# Patient Record
(demographics unavailable — no encounter records)

---

## 2025-07-11 NOTE — RESULTS/DATA
[TextEntry] : RADIOLOGY  ACC: [id number] EXAM:  XR CHEST AP OR PA 1V   ORDERED BY: CHRISTINA ESCAMILLA  PROCEDURE DATE:  2025    INTERPRETATION:  Clinical history reason for exam: Shortness of breath  Chest frontal.  No comparison.   Findings/ impression: Bibasilar opacities. Slight elevation of the left hemidiaphragm. Heart and mediastinum are unremarkable. Thoracic spine degenerative changes.   PFT 2025 FVC 2.14 L, 59% predicted FEV1: 1.02 L, 36% predicted FEV1/FVC: 27% No change with bronchodilator ELC_N2: 5.47 L, 87% predicted RV: 2.9 L, 144% predicted RV/T% predicted Impression: Severe obstructive ventilatory deficit without bronchodilator response.  Air-trapping is present.   EKG / ECHO     MICRO      BACTERIAL:       MYCOBACTERIAL:           FUNGAL:     PATH     OTHER LABS OF NOTE

## 2025-07-11 NOTE — PHYSICAL EXAM
[No Acute Distress] : no acute distress [2+ Pitting] : 2+ pitting [Oriented x3] : oriented x3 [Normal Affect] : normal affect [TextBox_68] : b/l wheezing; LLL crackles

## 2025-07-11 NOTE — ASSESSMENT
[FreeTextEntry1] : 63yo M PMH COPD/asthma, substance abuse - s/p recent (6/23-6/25/25) hospitalization for AeCOPD/asthma and cellulitis  # Asthma: lifelong  # COPD: sever obstruction with air trapping; sx of [place] and wheezing one exacerbation in the past year. --continue Advair, Spiriva and Albuterol PRN --Has POC. Resting SpO2 94%. Ambulatory SpO2 not tested today  # Abnormal CXR and LLL crackles on exam: ILD? --HRCT to evaluate for ILD  # Heavy former smoker --CT as above to screen for lung ca  # LE edema. Normal BNP --TBD  FU 6 wks

## 2025-07-11 NOTE — HISTORY OF PRESENT ILLNESS
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